# Patient Record
Sex: FEMALE | Race: OTHER | HISPANIC OR LATINO | URBAN - METROPOLITAN AREA
[De-identification: names, ages, dates, MRNs, and addresses within clinical notes are randomized per-mention and may not be internally consistent; named-entity substitution may affect disease eponyms.]

---

## 2024-11-14 ENCOUNTER — APPOINTMENT (RX ONLY)
Dept: URBAN - METROPOLITAN AREA CLINIC 163 | Facility: CLINIC | Age: 19
Setting detail: DERMATOLOGY
End: 2024-11-14

## 2024-11-14 DIAGNOSIS — L40.0 PSORIASIS VULGARIS: ICD-10-CM | Status: INADEQUATELY CONTROLLED

## 2024-11-14 PROCEDURE — ? DIAGNOSIS COMMENT

## 2024-11-14 PROCEDURE — ? PRESCRIPTION

## 2024-11-14 PROCEDURE — 99203 OFFICE O/P NEW LOW 30 MIN: CPT

## 2024-11-14 PROCEDURE — ? COUNSELING

## 2024-11-14 RX ORDER — KETOCONAZOLE 20 MG/ML
SHAMPOO, SUSPENSION TOPICAL
Qty: 120 | Refills: 3 | Status: ERX | COMMUNITY
Start: 2024-11-14

## 2024-11-14 RX ORDER — CLOBETASOL PROPIONATE 0.5 MG/ML
SOLUTION TOPICAL
Qty: 50 | Refills: 1 | Status: ERX | COMMUNITY
Start: 2024-11-14

## 2024-11-14 RX ORDER — CLOBETASOL PROPIONATE 0.05 G/100ML
SHAMPOO TOPICAL
Qty: 118 | Refills: 6 | Status: ERX | COMMUNITY
Start: 2024-11-14

## 2024-11-14 RX ADMIN — KETOCONAZOLE: 20 SHAMPOO, SUSPENSION TOPICAL at 00:00

## 2024-11-14 RX ADMIN — CLOBETASOL PROPIONATE: 0.05 SHAMPOO TOPICAL at 00:00

## 2024-11-14 RX ADMIN — CLOBETASOL PROPIONATE: 0.5 SOLUTION TOPICAL at 00:00

## 2024-11-14 ASSESSMENT — LOCATION SIMPLE DESCRIPTION DERM
LOCATION SIMPLE: POSTERIOR SCALP
LOCATION SIMPLE: SCALP

## 2024-11-14 ASSESSMENT — LOCATION DETAILED DESCRIPTION DERM
LOCATION DETAILED: LEFT INFERIOR POSTAURICULAR SKIN
LOCATION DETAILED: RIGHT POSTAURICULAR SKIN
LOCATION DETAILED: LEFT SUPERIOR PARIETAL SCALP

## 2024-11-14 ASSESSMENT — LOCATION ZONE DERM: LOCATION ZONE: SCALP

## 2024-11-14 ASSESSMENT — PGA PSORIASIS: PGA PSORIASIS 2020: MODERATE

## 2024-11-14 ASSESSMENT — ITCH NUMERIC RATING SCALE: HOW SEVERE IS YOUR ITCHING?: 3

## 2024-11-14 ASSESSMENT — BSA PSORIASIS: % BODY COVERED IN PSORIASIS: 3

## 2024-11-14 NOTE — PROCEDURE: DIAGNOSIS COMMENT
Render Risk Assessment In Note?: no
Detail Level: Simple
Comment: Recommended pt to shampoo everyday instead of twice a week. Advised pt to apply and rotate Ketoconazole shampoo, shampoos with salicylic acid, and provided an option to use shampoo with tar. If drying or smelly, may apply conditioner. Additionally advised pt to pre-treat scalp with Clobetasol scalp solution.\\nDiscussed additional treatments involving injects and oral medications if pt not satisfied with results.

## 2024-11-14 NOTE — HPI: RASH (PSORIASIS)
Do You Have A Family History Of Psoriasis?: no
Is This A New Presentation, Or A Follow-Up?: Psoriasis
Additional History: Slow spreading

## 2024-11-14 NOTE — PROCEDURE: COUNSELING
Cleansers Recommendations: Recommend gentle cleansers, dry skin tends to worsen psoriasis.  Cetaphil, Cerave, Aveeno, Dove good options.
Detail Level: Zone
Coal Tar Recommendations: Tar shampoo can be used every second or third shampoo on the scalp, Tgel  is available otc.  Topical formulations of coal tar are also available otc, , Psoriasin, Cutar are good options
Topical Keratolytics Recommendations: Topicals with salicyclic acid are good for thicker plaques, otc options include Cerave psoriasis, Gold bond psoriasis,  SA, Triderma with SA
Moisturizers Recommendations: Recommend moisturizer creams, fragrance free is preferred.  Aveeno, Cetaphil, Cerave, Eucerin, Amlactin all good options.
Topical Steroids Recommendations: Apply topical steroids to the red, scaly plaques, with improvement taper off and limit use on the face and in the body fold areas to one week, then take one week off.

## 2024-12-12 ENCOUNTER — APPOINTMENT (OUTPATIENT)
Dept: URBAN - METROPOLITAN AREA CLINIC 163 | Facility: CLINIC | Age: 19
Setting detail: DERMATOLOGY
End: 2024-12-12

## 2024-12-12 DIAGNOSIS — L40.0 PSORIASIS VULGARIS: ICD-10-CM | Status: IMPROVED

## 2024-12-12 PROCEDURE — ? PRESCRIPTION MEDICATION MANAGEMENT

## 2024-12-12 PROCEDURE — ? PRESCRIPTION

## 2024-12-12 PROCEDURE — ? DIAGNOSIS COMMENT

## 2024-12-12 PROCEDURE — ? COUNSELING

## 2024-12-12 PROCEDURE — 99213 OFFICE O/P EST LOW 20 MIN: CPT

## 2024-12-12 RX ORDER — BETAMETHASONE DIPROPIONATE 0.5 MG/ML
LOTION TOPICAL
Qty: 60 | Refills: 0 | Status: ERX | COMMUNITY
Start: 2024-12-12

## 2024-12-12 RX ORDER — CALCIPOTRIENE 50 UG/G
CREAM TOPICAL
Qty: 60 | Refills: 2 | Status: ERX | COMMUNITY
Start: 2024-12-12

## 2024-12-12 RX ORDER — HYDROCORTISONE 25 MG/G
CREAM TOPICAL
Qty: 30 | Refills: 0 | Status: ERX | COMMUNITY
Start: 2024-12-12

## 2024-12-12 RX ORDER — CLOBETASOL PROPIONATE 0.5 MG/G
CREAM TOPICAL
Qty: 30 | Refills: 1 | Status: ERX | COMMUNITY
Start: 2024-12-12

## 2024-12-12 RX ADMIN — CLOBETASOL PROPIONATE: 0.5 CREAM TOPICAL at 00:00

## 2024-12-12 RX ADMIN — BETAMETHASONE DIPROPIONATE: 0.5 LOTION TOPICAL at 00:00

## 2024-12-12 RX ADMIN — CALCIPOTRIENE: 50 CREAM TOPICAL at 00:00

## 2024-12-12 RX ADMIN — HYDROCORTISONE: 25 CREAM TOPICAL at 00:00

## 2024-12-12 ASSESSMENT — BSA PSORIASIS: % BODY COVERED IN PSORIASIS: 4

## 2024-12-12 ASSESSMENT — LOCATION DETAILED DESCRIPTION DERM
LOCATION DETAILED: RIGHT POSTAURICULAR SKIN
LOCATION DETAILED: LEFT SUPERIOR PARIETAL SCALP
LOCATION DETAILED: LEFT INFERIOR POSTAURICULAR SKIN

## 2024-12-12 ASSESSMENT — LOCATION SIMPLE DESCRIPTION DERM
LOCATION SIMPLE: SCALP
LOCATION SIMPLE: POSTERIOR SCALP

## 2024-12-12 ASSESSMENT — PGA PSORIASIS: PGA PSORIASIS 2020: MILD

## 2024-12-12 ASSESSMENT — ITCH NUMERIC RATING SCALE: HOW SEVERE IS YOUR ITCHING?: 4

## 2024-12-12 ASSESSMENT — LOCATION ZONE DERM: LOCATION ZONE: SCALP

## 2024-12-12 NOTE — PROCEDURE: PRESCRIPTION MEDICATION MANAGEMENT
Render In Strict Bullet Format?: No
Initiate Treatment: -clobetasol cream behind left and right posterior ears twice daily x 7 days then taper off as improved;use as rescue medication only \\n-hydrocortisone 2.5% cream apply twice daily to forehead x 2 weeks taper off as improved\\n-calcipotriene cream apply twice daily to psoriasis patches; ok to use daily
Detail Level: Zone
Continue Regimen: -alternating clobetasol shampoo, ketoconazole 2% shampoo, and OTC Neutrogena Tsal

## 2024-12-12 NOTE — PROCEDURE: DIAGNOSIS COMMENT
Render Risk Assessment In Note?: no
Detail Level: Simple
Comment: Recommended pt to shampoo everyday instead of twice a week. Advised pt to apply and rotate Ketoconazole shampoo, shampoos with salicylic acid, and provided an option to use shampoo with tar. If drying or smelly, may apply conditioner. Additionally advised pt to pre-treat scalp with Clobetasol scalp solution.\\nDiscussed additional treatments involving injects and oral medications if pt not satisfied with results.\\n*************12/12/24\\n-discussed adding in cream based steroids; patient states clobetasol solution burns skin\\n-discussed topical vitamin D\\n-if not improved with topicals; plan on systemic tx

## 2025-03-13 ENCOUNTER — APPOINTMENT (OUTPATIENT)
Dept: URBAN - METROPOLITAN AREA CLINIC 163 | Facility: CLINIC | Age: 20
Setting detail: DERMATOLOGY
End: 2025-03-13

## 2025-03-13 DIAGNOSIS — L40.0 PSORIASIS VULGARIS: ICD-10-CM

## 2025-03-13 PROBLEM — L30.9 DERMATITIS, UNSPECIFIED: Status: ACTIVE | Noted: 2025-03-13

## 2025-03-13 PROCEDURE — ? DIAGNOSIS COMMENT

## 2025-03-13 PROCEDURE — 99213 OFFICE O/P EST LOW 20 MIN: CPT

## 2025-03-13 PROCEDURE — ? COUNSELING

## 2025-03-13 PROCEDURE — ? PRESCRIPTION MEDICATION MANAGEMENT

## 2025-03-13 PROCEDURE — ? PRESCRIPTION

## 2025-03-13 RX ORDER — CLOBETASOL PROPIONATE 0.05 G/100ML
SHAMPOO TOPICAL
Qty: 118 | Refills: 2 | Status: ERX | COMMUNITY
Start: 2025-03-13

## 2025-03-13 RX ORDER — CLOBETASOL PROPIONATE 0.5 MG/ML
SOLUTION TOPICAL
Qty: 50 | Refills: 1 | Status: ERX

## 2025-03-13 RX ORDER — KETOCONAZOLE 20 MG/ML
SHAMPOO, SUSPENSION TOPICAL
Qty: 120 | Refills: 2 | Status: ERX

## 2025-03-13 RX ORDER — BETAMETHASONE DIPROPIONATE 0.5 MG/ML
LOTION TOPICAL
Qty: 60 | Refills: 1 | Status: ERX

## 2025-03-13 RX ADMIN — CLOBETASOL PROPIONATE: 0.05 SHAMPOO TOPICAL at 00:00

## 2025-03-13 ASSESSMENT — LOCATION DETAILED DESCRIPTION DERM
LOCATION DETAILED: RIGHT POSTAURICULAR SKIN
LOCATION DETAILED: LEFT INFERIOR POSTAURICULAR SKIN
LOCATION DETAILED: LEFT SUPERIOR PARIETAL SCALP

## 2025-03-13 ASSESSMENT — ITCH NUMERIC RATING SCALE: HOW SEVERE IS YOUR ITCHING?: 4

## 2025-03-13 ASSESSMENT — LOCATION SIMPLE DESCRIPTION DERM
LOCATION SIMPLE: SCALP
LOCATION SIMPLE: POSTERIOR SCALP

## 2025-03-13 ASSESSMENT — LOCATION ZONE DERM: LOCATION ZONE: SCALP

## 2025-03-13 ASSESSMENT — PGA PSORIASIS: PGA PSORIASIS 2020: MILD

## 2025-03-13 ASSESSMENT — BSA PSORIASIS: % BODY COVERED IN PSORIASIS: 4

## 2025-03-13 NOTE — PROCEDURE: PRESCRIPTION MEDICATION MANAGEMENT
Render In Strict Bullet Format?: No
Detail Level: Zone
Plan: Sent refill for Clobetasol shampoo, Ketoconazole shampoo, Clobetasol solution, Betamethasone lotion,
Continue Regimen: -alternating clobetasol shampoo, ketoconazole 2% shampoo, and OTC Neutrogena Tsal\\n-clobetasol cream behind left and right posterior ears twice daily x 7 days then taper off as improved;use as rescue medication only \\n-hydrocortisone 2.5% cream apply twice daily to forehead x 2 weeks taper off as improved\\n-calcipotriene cream apply twice daily to psoriasis patches; ok to use daily\\n- clobetasol solution \\n- betamethasone lotion

## 2025-03-13 NOTE — PROCEDURE: DIAGNOSIS COMMENT
Render Risk Assessment In Note?: no
Detail Level: Simple
Comment: =====11/14/24\\nRecommended pt to shampoo everyday instead of twice a week. Advised pt to apply and rotate Ketoconazole shampoo, shampoos with salicylic acid, and provided an option to use shampoo with tar. If drying or smelly, may apply conditioner. Additionally advised pt to pre-treat scalp with Clobetasol scalp solution.\\nDiscussed additional treatments involving injects and oral medications if pt not satisfied with results.\\n=====12/12/24\\n-discussed adding in cream based steroids; patient states clobetasol solution burns skin\\n-discussed topical vitamin D\\n-if not improved with topicals; plan on systemic tx\\n=====03/13/25\\n-some scaling, inflammation seen in scalp\\n-discussed continuing topicals, if it stops helping may need to switch to systemic treatment.